# Patient Record
Sex: MALE | Race: BLACK OR AFRICAN AMERICAN
[De-identification: names, ages, dates, MRNs, and addresses within clinical notes are randomized per-mention and may not be internally consistent; named-entity substitution may affect disease eponyms.]

---

## 2020-01-05 ENCOUNTER — HOSPITAL ENCOUNTER (EMERGENCY)
Dept: HOSPITAL 54 - ER | Age: 55
Discharge: HOME | End: 2020-01-05
Payer: SELF-PAY

## 2020-01-05 VITALS — DIASTOLIC BLOOD PRESSURE: 90 MMHG | SYSTOLIC BLOOD PRESSURE: 146 MMHG

## 2020-01-05 VITALS — WEIGHT: 205 LBS | HEIGHT: 70 IN | BODY MASS INDEX: 29.35 KG/M2
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
## 2020-01-05 DIAGNOSIS — Z59.0: ICD-10-CM
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
## 2020-01-05 DIAGNOSIS — S11.90XA: Primary | ICD-10-CM

## 2020-01-05 SDOH — ECONOMIC STABILITY - HOUSING INSECURITY: HOMELESSNESS: Z59.0

## 2020-01-05 NOTE — NUR
Patient declined shelter he refused to sign noted patient able to ambulated non 
distress wound re dressing agrees to follow up PMD in 1 day